# Patient Record
Sex: MALE | Race: WHITE
[De-identification: names, ages, dates, MRNs, and addresses within clinical notes are randomized per-mention and may not be internally consistent; named-entity substitution may affect disease eponyms.]

---

## 2018-11-04 ENCOUNTER — HOSPITAL ENCOUNTER (EMERGENCY)
Dept: HOSPITAL 46 - ED | Age: 33
Discharge: HOME | End: 2018-11-04
Payer: COMMERCIAL

## 2018-11-04 VITALS — WEIGHT: 180.01 LBS | HEIGHT: 70 IN | BODY MASS INDEX: 25.77 KG/M2

## 2018-11-04 DIAGNOSIS — I10: ICD-10-CM

## 2018-11-04 DIAGNOSIS — F17.200: ICD-10-CM

## 2018-11-04 DIAGNOSIS — A63.0: Primary | ICD-10-CM

## 2021-09-09 NOTE — XMS
PreManage Notification: JOSHUA JAIME MRN:[id number]
 
Security Information
 
Security Events
No recent Security Events currently on file
 
 
 
CRITERIA MET
------------
- [facility] - Has Care Guidelines
 
 
CARE PROVIDERS
There are no care providers on record at this time.
 
Guidelines Source: [employer] [facility] [place]
Guidelines Date: 05/05/2020
 
Care Coordination:
    Member is currently enrolled in Mental Health Services through [email or web address].
    If services are needed through [employer] please call:
    Kalpesh [phone]
    [place]; [phone]
    Crisis [phone]
 
 
E.DParrish VISIT COUNT (12 MO.)
-------------------------------------------------------------------------------------
1 [facility]
-------------------------------------------------------------------------------------
TOTAL 1
-------------------------------------------------------------------------------------
NOTE: Visits indicate total known visits.
 
ED/UCC VISIT TRACKING (12 MO.)
-------------------------------------------------------------------------------------
09/09/2021 04:18
[facility] OR
 
TYPE: Emergency
 
COMPLAINT:
- CHEST PRESSURE
-------------------------------------------------------------------------------------
 
 
INPATIENT VISIT TRACKING (12 MO.)
No inpatient visits to display in this time frame
 
[email or web address]

## 2021-09-10 NOTE — EKG
[facility]
                                    [address]
                                  [place], [place]  [address]
_________________________________________________________________________________________
                                                                 Signed   
 
 
Sinus tachycardia
Otherwise normal ECG
No previous ECGs available
Confirmed by ERNESTO BHAKTA DO ([id number]) on 9/10/2021 2:53:24 PM
 
 
 
 
 
 
 
 
 
 
 
 
 
 
 
 
 
 
 
 
 
 
 
 
 
 
 
 
 
 
 
 
 
 
 
 
 
 
 
 
 
    Electronically Signed By: ERNESTO BHAKTA DO  09/10/21 1453
_________________________________________________________________________________________
PATIENT NAME:     JOSHUA JAIME TOBIAS                      
MEDICAL RECORD #: [id number]                     Electrocardiogram             
          ACCT #: [id number]  
DATE OF BIRTH:   [birth year]                                       
PHYSICIAN:   ERNESTO BHAKTA DO                     REPORT #: [id number]
REPORT IS CONFIDENTIAL AND NOT TO BE RELEASED WITHOUT AUTHORIZATION